# Patient Record
Sex: FEMALE | Race: WHITE | NOT HISPANIC OR LATINO | Employment: OTHER | URBAN - METROPOLITAN AREA
[De-identification: names, ages, dates, MRNs, and addresses within clinical notes are randomized per-mention and may not be internally consistent; named-entity substitution may affect disease eponyms.]

---

## 2023-06-22 ENCOUNTER — HOSPITAL ENCOUNTER (EMERGENCY)
Facility: HOSPITAL | Age: 73
Discharge: HOME | End: 2023-06-22
Attending: EMERGENCY MEDICINE
Payer: MEDICARE

## 2023-06-22 VITALS
OXYGEN SATURATION: 99 % | HEIGHT: 63 IN | SYSTOLIC BLOOD PRESSURE: 154 MMHG | WEIGHT: 190 LBS | BODY MASS INDEX: 33.66 KG/M2 | RESPIRATION RATE: 20 BRPM | HEART RATE: 73 BPM | DIASTOLIC BLOOD PRESSURE: 72 MMHG | TEMPERATURE: 99 F

## 2023-06-22 DIAGNOSIS — S01.81XA LACERATION OF FOREHEAD, INITIAL ENCOUNTER: ICD-10-CM

## 2023-06-22 DIAGNOSIS — S09.90XA INJURY OF HEAD, INITIAL ENCOUNTER: Primary | ICD-10-CM

## 2023-06-22 PROCEDURE — 12011 RPR F/E/E/N/L/M 2.5 CM/<: CPT

## 2023-06-22 PROCEDURE — 99284 EMERGENCY DEPT VISIT MOD MDM: CPT | Mod: 25

## 2023-06-22 PROCEDURE — 25000003 PHARM REV CODE 250: Performed by: EMERGENCY MEDICINE

## 2023-06-22 RX ORDER — MUPIROCIN 20 MG/G
OINTMENT TOPICAL
Status: COMPLETED | OUTPATIENT
Start: 2023-06-22 | End: 2023-06-22

## 2023-06-22 RX ORDER — LIDOCAINE HYDROCHLORIDE AND EPINEPHRINE 10; 10 MG/ML; UG/ML
10 INJECTION, SOLUTION INFILTRATION; PERINEURAL
Status: DISCONTINUED | OUTPATIENT
Start: 2023-06-22 | End: 2023-06-22 | Stop reason: HOSPADM

## 2023-06-22 RX ORDER — LIDOCAINE HYDROCHLORIDE 10 MG/ML
10 INJECTION, SOLUTION EPIDURAL; INFILTRATION; INTRACAUDAL; PERINEURAL
Status: COMPLETED | OUTPATIENT
Start: 2023-06-22 | End: 2023-06-22

## 2023-06-22 RX ORDER — LIDOCAINE HYDROCHLORIDE 10 MG/ML
INJECTION, SOLUTION EPIDURAL; INFILTRATION; INTRACAUDAL; PERINEURAL
Status: DISCONTINUED
Start: 2023-06-22 | End: 2023-06-22 | Stop reason: HOSPADM

## 2023-06-22 RX ADMIN — MUPIROCIN 1 G: 20 OINTMENT TOPICAL at 11:06

## 2023-06-22 RX ADMIN — LIDOCAINE HYDROCHLORIDE 100 MG: 10 INJECTION, SOLUTION EPIDURAL; INFILTRATION; INTRACAUDAL; PERINEURAL at 11:06

## 2023-06-22 NOTE — ED PROVIDER NOTES
Encounter Date: 6/22/2023       History     Chief Complaint   Patient presents with    Head Injury    Head Laceration     Pt fell outside of mob     Patient presents complaining of fall from standing in the parking lot striking her head.  She has laceration to the left eyebrow.  She did not lose consciousness.    Review of patient's allergies indicates:  No Known Allergies  No past medical history on file.  No past surgical history on file.  No family history on file.     Review of Systems   All other systems reviewed and are negative.    Physical Exam     Initial Vitals [06/22/23 1111]   BP Pulse Resp Temp SpO2   (!) 190/91 86 20 99 °F (37.2 °C) 96 %      MAP       --         Physical Exam    Nursing note and vitals reviewed.  Constitutional: She appears well-developed and well-nourished.   Pleasant, polite   HENT:   Head: Normocephalic.   There is a 2 cm laceration to the left eyebrow.   Eyes: EOM are normal.   Neck: Neck supple.   Normal range of motion.  Cardiovascular:  Intact distal pulses.           Pulmonary/Chest: No respiratory distress.   Musculoskeletal:      Cervical back: Normal range of motion and neck supple.      Comments: There is abrasion to the right knee.     Neurological: She is alert and oriented to person, place, and time.   Vision - Normal  Aphasia - Normal  Neglect - Normal  Pronator drift - Normal  Cerebellum - Normal  RUE strength - Normal  RLE strength - Normal  LUE strength - Normal  LLE strength - Normal   Skin: Skin is warm and dry. Capillary refill takes less than 2 seconds.   Psychiatric: She has a normal mood and affect. Her behavior is normal. Judgment and thought content normal.       ED Course   Lac Repair    Date/Time: 6/22/2023 1:55 PM  Performed by: Elia Benítez MD  Authorized by: Elia Benítez MD     Laceration details:     Location:  Face    Face location:  L eyebrow    Wound length (cm): 2.  Pre-procedure details:     Preparation:  Patient was prepped and draped in  usual sterile fashion  Exploration:     Wound extent: no foreign bodies/material noted    Treatment:     Area cleansed with:  Povidone-iodine  Skin repair:     Repair method:  Sutures    Suture size:  4-0    Suture material:  Nylon    Suture technique:  Simple interrupted    Number of sutures: 3.  Approximation:     Approximation:  Close  Repair type:     Repair type:  Simple  Post-procedure details:     Dressing:  Antibiotic ointment    Procedure completion:  Tolerated well, no immediate complications  Labs Reviewed - No data to display       Imaging Results              CT Maxillofacial Without Contrast (Final result)  Result time 06/22/23 13:46:29      Final result by Dante Murcia MD (06/22/23 13:46:29)                   Narrative:    CMS MANDATED QUALITY DATA - CT RADIATION  436    All CT scans at this facility utilize dose modulation, iterative reconstruction, and/or weight based dosing when appropriate to reduce radiation dose to as low as reasonably achievable.    CT MAXILLOFACIAL WITHOUT IV CONTRAST    CLINICAL HISTORY:  72 years Female Facial trauma, blunt    COMPARISON: None    FINDINGS: Please see separately dictated CT head for intracranial findings. There is soft tissue swelling in the left supraorbital region suggesting hematoma/laceration in the setting of trauma. No radiopaque foreign body. Postoperative changes of the left globe.    Frontal bone is intact. Nasal bone is intact. Orbits are intact.    Zygomatic arches are intact. No fracture of the maxilla or mandible. Temporomandibular joints are normally aligned. Mucosal thickening inferior left maxillary sinus.    Mastoid air cells are clear.    Limited visualization of the cervical spine demonstrates degenerative changes. No acute osseous abnormality.    IMPRESSION:    Left supraorbital scalp laceration/hematoma.    Negative for acute maxillofacial fracture.    Electronically signed by:  Dante Murcia MD  6/22/2023 1:46 PM CDT Workstation:  [id number]                                     CT Head Without Contrast (Final result)  Result time 06/22/23 13:42:52      Final result by Dante Murcia MD (06/22/23 13:42:52)                   Narrative:    CMS MANDATED QUALITY DATA - CT RADIATION  436    All CT scans at this facility utilize dose modulation, iterative reconstruction, and/or weight based dosing when appropriate to reduce radiation dose to as low as reasonably achievable.    CT HEAD WITHOUT IV CONTRAST    CLINICAL HISTORY:  72 years Female Head trauma, intracranial venous injury suspected    COMPARISON: None    FINDINGS: Negative for acute intracranial hemorrhage, midline shift, or mass effect. Ventricles and sulci are normal in size. Gray-white differentiation is maintained. Cerebellar hemispheres and brainstem are unremarkable. Atherosclerotic calcification of the intracranial carotid arteries.    No calvarial lesion or fracture. Mastoid air cells are clear. Paranasal sinuses are clear.    IMPRESSION:    No CT evidence of acute intracranial pathology.    Electronically signed by:  Dante Murcia MD  6/22/2023 1:42 PM CDT Workstation: [id number]                                     Medications   LIDOcaine-EPINEPHrine 1%-1:100,000 injection 10 mL (has no administration in time range)   LIDOcaine (PF) 10 mg/ml (1%) injection 100 mg (100 mg Infiltration Given by Other 6/22/23 1138)   mupirocin 2 % ointment (1 g Topical (Top) Given 6/22/23 1158)     Medical Decision Making:   Differential Diagnosis:   Intracranial hemorrhage, skull fracture, facial fracture  Clinical Tests:   Radiological Study: Reviewed and Ordered  ED Management:  CTs were by myself and reviewed by myself in the radiology interpretation there is no acute intracranial hemorrhage.    Patient is advised suture removal in 5 days.  Tissue was advised Neosporin ointment to the laceration.  She was given detailed return precautions.                        Clinical Impression:   Final  diagnoses:  [S09.90XA] Injury of head, initial encounter (Primary)  [S01.81XA] Laceration of forehead, initial encounter        ED Disposition Condition    Discharge Stable          ED Prescriptions    None       Follow-up Information       Follow up With Specialties Details Why Contact Info    Amelia Clark NP Family Medicine Schedule an appointment as soon as possible for a visit in 2 days  [address] [facility] [place] [address]  [phone]               Elia Benítez MD  06/22/23 4350

## 2023-06-27 ENCOUNTER — HOSPITAL ENCOUNTER (EMERGENCY)
Facility: HOSPITAL | Age: 73
Discharge: HOME | End: 2023-06-27
Attending: EMERGENCY MEDICINE
Payer: MEDICARE

## 2023-06-27 VITALS
HEART RATE: 100 BPM | OXYGEN SATURATION: 95 % | RESPIRATION RATE: 20 BRPM | TEMPERATURE: 98 F | BODY MASS INDEX: 33.66 KG/M2 | WEIGHT: 190 LBS | SYSTOLIC BLOOD PRESSURE: 152 MMHG | DIASTOLIC BLOOD PRESSURE: 85 MMHG | HEIGHT: 63 IN

## 2023-06-27 DIAGNOSIS — Z48.02 VISIT FOR SUTURE REMOVAL: Primary | ICD-10-CM

## 2023-06-27 PROCEDURE — 99282 EMERGENCY DEPT VISIT SF MDM: CPT

## 2023-06-27 NOTE — ED PROVIDER NOTES
Encounter Date: 6/27/2023       History     Chief Complaint   Patient presents with    Suture / Staple Removal     Pt presents to the ER with request for suture removal to left eyebrow.      This is a 72-year-old female presenting for suture removal.  She fell 5 days ago and was seen here.  She had left eyebrow laceration repair.  CT of her head and face were obtained and negative for acute process.  Patient does state that after she left she noticed bruising of her right knee and left shoulder.  She says the area is sore but denies significant pain and has been ambulating and using her arm without difficulty.      Review of patient's allergies indicates:  No Known Allergies  No past medical history on file.  No past surgical history on file.  No family history on file.     Review of Systems   Skin:  Positive for wound.   All other systems reviewed and are negative.    Physical Exam     Initial Vitals [06/27/23 1324]   BP Pulse Resp Temp SpO2   (!) 152/85 100 20 98 °F (36.7 °C) 95 %      MAP       --         Physical Exam    Constitutional: She appears well-developed and well-nourished. She is not diaphoretic.   HENT:   Left eyebrow laceration is intact and healing well.  Left periorbital ecchymosis without significant swelling or erythema.   Eyes: Conjunctivae and EOM are normal.   Pulmonary/Chest: No respiratory distress.   Musculoskeletal:         General: No tenderness.      Comments: Bruising of the left shoulder and right knee without bony tenderness, range of motion intact.     Neurological: She is alert and oriented to person, place, and time.   Skin: Skin is warm and dry.       ED Course   Procedures  Labs Reviewed - No data to display       Imaging Results    None          Medications - No data to display  Medical Decision Making:   Initial Assessment:   No evidence of wound infection or other acute process.  Sutures were removed, discharged home.                        Clinical Impression:   Final  diagnoses:  [Z48.02] Visit for suture removal (Primary)        ED Disposition Condition    Discharge Stable          ED Prescriptions    None       Follow-up Information       Follow up With Specialties Details Why Contact Info    Amelia Clark NP Family Medicine  As needed [address] [facility] [place] [address]  [phone]               Kyle Ha MD  06/27/23 3273